# Patient Record
Sex: FEMALE | Race: WHITE
[De-identification: names, ages, dates, MRNs, and addresses within clinical notes are randomized per-mention and may not be internally consistent; named-entity substitution may affect disease eponyms.]

---

## 2018-02-15 NOTE — RAD
HISTORY: Cough



COMPARISONS: None



VIEWS: 4: Frontal dual-energy and lateral views of the chest.



FINDINGS:

CARDIOMEDIASTINAL SILHOUETTE: The cardiomediastinal silhouette is normal.

ALPHONSE: The alphonse are normal.

PLEURA: The costophrenic angles are sharp. No pleural abnormalities are noted.

LUNG PARENCHYMA: There is patchy alveolar opacification of the lingula.

ABDOMEN: The upper abdomen is clear. There is no subphrenic gas.

BONES AND SOFT TISSUES: No bone or soft tissue abnormalities are noted.

OTHER: None.



IMPRESSION:

PATCHY LINGULAR CONSOLIDATION. RECOMMEND FOLLOW-UP UNTIL RESOLUTION TO EXCLUDE UNDERLYING

PULMONARY PARENCHYMAL PATHOLOGY.

## 2018-02-15 NOTE — UC
Respiratory Complaint HPI





- HPI Summary


HPI Summary: 





cough x 8 days


+ chest congest, nasal congestion , fever, chills , body aches


cough is productive, yellow sputum


has been dizzy and lightheaded 





- History of Current Complaint


Chief Complaint: UCRespiratory


Stated Complaint: FEVER, COUGH, VOMITING


Time Seen by Provider: 02/15/18 14:02


Hx Obtained From: Patient


Hx Last Menstrual Period: 1/18/18


Onset/Duration: Gradual Onset, Lasting Days - 8, Still Present


Severity Initially: Moderate


Severity Currently: Moderate


Pain Intensity: 0


Character: Cough: Productive


Aggravating Factors: Exertion, Deep Breaths


Associated Signs And Symptoms: Positive: Fever, Chills, URI, Nasal Congestion.  

Negative: Calf Pain, Calf Swelling





- Allergies/Home Medications


Allergies/Adverse Reactions: 


 Allergies











Allergy/AdvReac Type Severity Reaction Status Date / Time


 


No Known Allergies Allergy   Verified 02/15/18 13:43











Home Medications: 


 Home Medications





Caffeine 200 mg PO DAILY 02/15/18 [History Confirmed 02/15/18]


Dm/P-Ephed/Acetaminoph/Doxylam [Nighttime D Cold-Flu Rlf Liq] 1 cap PO PRN 02/15

/18 [History]


Oral Contraceptive 1 tab PO DAILY 02/15/18 [History]











PMH/Surg Hx/FS Hx/Imm Hx





- Additional Past Medical History


Additional PMH: 





myotonic muscular dystrophy


LOW PUMONARY FUCTION





- Surgical History


Surgical History: Yes


Surgery Procedure, Year, and Place: cyst removals.  tubes in ears.  BILATERAL 

FALLOPIAN TUBES REMOVED





- Family History


Known Family History: 


   Negative: Diabetes





- Social History


Alcohol Use: None


Substance Use Type: None


Smoking Status (MU): Never Smoked Tobacco





Review of Systems


Constitutional: Fever, Chills, Fatigue


Skin: Negative


Eyes: Negative


ENT: Nasal Discharge


Respiratory: Cough


Cardiovascular: Negative


Gastrointestinal: Negative


Is Patient Immunocompromised?: No


All Other Systems Reviewed And Are Negative: Yes





Physical Exam


Triage Information Reviewed: Yes


Appearance: Well-Nourished, Pain Distress


Vital Signs: 


 Initial Vital Signs











Temp  98.4 F   02/15/18 13:46


 


Pulse  116   02/15/18 13:46


 


Resp  24   02/15/18 13:46


 


BP  112/89   02/15/18 13:46


 


Pulse Ox  97   02/15/18 13:46











Vital Signs Reviewed: Yes


Eyes: Positive: Conjunctiva Clear


ENT: Positive: Normal ENT inspection, Hearing grossly normal, Pharynx normal, 

Nasal drainage


Neck: Positive: Supple, Nontender, No Lymphadenopathy


Respiratory: Positive: Chest non-tender, Decreased breath sounds, Crackles - 

diffuse bilateral crackles


Cardiovascular: Positive: Tachycardia


Skin Exam: Normal





UC Diagnostic Evaluation





- Laboratory


O2 Sat by Pulse Oximetry: 97





Respiratory Course/Dx





- Differential Dx/Diagnosis


Provider Diagnoses: bronchitis





Discharge





- Discharge Plan


Referrals: 


Mahendra Garcia MD [Primary Care Provider] -

## 2019-04-01 NOTE — UC
Minor Trauma HPI





- HPI Summary


HPI Summary: 


 38 year old female with h/o R ankle fx in high school presents after fall at 

work today, when her roller cart tipped, taking patient down with her, hitting 

her right lower extremity with increased pain at knee and ankle.  + ambulatory 

with mild pain at b/l ankles, R knee.  + bruising at right and left shins.   ? 

hit head, however no LOC, no HA, no pain, no neck pain.    small abrasion R knee

, cleaned by nurse.   














- History of Current Complaint


Chief Complaint: UCLowerExtremity


Stated Complaint: WC-SP FALL,RT LEG, ARM INJURY


Time Seen by Provider: 04/01/19 13:19


Hx Obtained From: Patient


Hx Last Menstrual Period: 3/3/19


Pregnant?: No


Onset/Duration: Sudden Onset, Lasting Minutes


Onset Of Pain: Immediate


Severity Initially: Moderate


Severity Currently: Moderate


Pain Intensity: 7


Pain Scale Used: 0-10 Numeric


Mechanism Of Injury: Fall From Height Of: - standing


Aggravating Factor(s): Ambulation


Alleviating Factor(s): Rest


Associated Signs And Symptoms: Negative: Loss Of Consciousness





- Allergies/Home Medications


Allergies/Adverse Reactions: 


 Allergies











Allergy/AdvReac Type Severity Reaction Status Date / Time


 


No Known Allergies Allergy   Verified 04/01/19 13:12











Home Medications: 


 Home Medications





Cholecalciferol TAB* [Vitamin D TAB*] 1,000 unit PO DAILY 04/01/19 [History 

Confirmed 04/01/19]


Levonorgestrel (Iud) [Liletta IUD] 18.6 mcg IU 04/01/19 [History]











PMH/Surg Hx/FS Hx/Imm Hx


Previously Healthy: Yes





- Surgical History


Surgical History: Yes


Surgery Procedure, Year, and Place: cyst removals.  tubes in ears.  BILATERAL 

FALLOPIAN TUBES REMOVED.  D&C- TO REMOVE UTERINE POLPS





- Family History


Known Family History: 


   Negative: Diabetes





- Social History


Alcohol Use: Rare


Substance Use Type: None


Smoking Status (MU): Never Smoked Tobacco





Review of Systems


All Other Systems Reviewed And Are Negative: Yes


Constitutional: Positive: Negative


Neurovascular: Negative: Decreased Sensation, Decreased Pulses


Musculoskeletal: Positive: Arthralgia, Decreased ROM, Edema, Myalgia


Neurological: Negative: Headache


Psychological: Positive: Negative


Is Patient Immunocompromised?: No





Physical Exam





- Summary


Physical Exam Summary: 





TTP over distal patella with mild pre-patellar swelling noted.  


Triage Information Reviewed: Yes


Appearance: Well-Appearing, No Pain Distress, Well-Nourished


Vital Signs: 


 Initial Vital Signs











Temp  98.3 F   04/01/19 13:15


 


Pulse  84   04/01/19 13:15


 


Resp  17   04/01/19 13:15


 


BP  107/74   04/01/19 13:15


 


Pulse Ox  99   04/01/19 13:15











Vital Signs Reviewed: Yes


Eyes: Positive: Conjunctiva Clear


Musculoskeletal: Positive: Strength Intact, ROM Intact, Edema @ - mild b/l 

lower tib b/l.


Neurological: Positive: Muscle Tone Normal, Other: - bl knees- neg mcmurrays, 

no instability, neg acl/ pcl.   + TTP over MCL, LCL R knee, none L knee.  mild 

prepatellar swelling R knee.   neg homans b/l, achilles intact b/l, small 

bruising noted b/l lower tibia with TTP over bruising, no deformity noted.   

TTP over R anterior ankle, however with distaction no tenderness appreciated.  

no TTP over b/lmed/ lat mal.   no ankle swelling, bruising b/l.  full ROM both 

AROM/ PROM with full strength b/l ankles.   PT, DP 2+ b/l, SITLT>


Psychological Exam: Normal


Skin: Positive: Other - small superficial abrasion Right knee, cleaned without 

difficulty, no bleeding, bruising.





Minor Trauma Course/Dx





- Course


Course Of Treatment: 





radiograph R knee- negative fro fx.   Follow up with orthopedics if no 

improvement within 2-3 days 


- bone contusion 








- Differential Dx/Diagnosis


Differential Diagnosis/HQI/PQRI: Contusion(s), Fracture


Provider Diagnosis: 


 Contusion of both tibias, Prepatellar bursitis








Discharge





- Sign-Out/Discharge


Documenting (check all that apply): Patient Departure


All imaging exams completed and their final reports reviewed: Yes





- Discharge Plan


Condition: Good


Disposition: HOME


Patient Education Materials:  Contusion in Adults (ED), R.I.C.E. Treatment (ED)

, Knee Bursitis (ED)


Forms:  *Work Release


Referrals: 


Mahendra Garcia MD [Primary Care Provider] - 


Yair Ryder MD [Medical Doctor] - 


(follow up in 3-5 days if no improvement 


)


Additional Instructions: 


- Elevate, rest, ice bilateral shin contusions/ bruising 


- Motrin/ Tylenol as needed for pain.  


- Work note for tomorrow if needed 


- Follow up with orthopedics if no improvement 








- Billing Disposition and Condition


Condition: GOOD


Disposition: Home

## 2019-06-23 ENCOUNTER — HOSPITAL ENCOUNTER (EMERGENCY)
Dept: HOSPITAL 25 - UCCORT | Age: 39
Discharge: HOME | End: 2019-06-23
Payer: COMMERCIAL

## 2019-06-23 VITALS — SYSTOLIC BLOOD PRESSURE: 102 MMHG | DIASTOLIC BLOOD PRESSURE: 61 MMHG

## 2019-06-23 DIAGNOSIS — R11.10: Primary | ICD-10-CM

## 2019-06-23 DIAGNOSIS — E86.0: ICD-10-CM

## 2019-06-23 DIAGNOSIS — R19.7: ICD-10-CM

## 2019-06-23 PROCEDURE — 99212 OFFICE O/P EST SF 10 MIN: CPT

## 2019-06-23 PROCEDURE — G0463 HOSPITAL OUTPT CLINIC VISIT: HCPCS

## 2019-06-23 PROCEDURE — 96360 HYDRATION IV INFUSION INIT: CPT

## 2019-06-23 NOTE — UC
UC General HPI





- HPI Summary


HPI Summary: 





pt awoke around 4:40 this am feeling ill and weak.


she then developed some nausea, vomiting and diarrhea about every 1-2 hours 

since then.


at times she feels light headed like she wants to pass out.


she is afraid to drink because she will throw up.


she went to work and they were giving her a hard time about going home due to 

this illness.


she denies any abdominal pain, travel hx, fever and IBD.





- History of Current Complaint


Chief Complaint: UCGI


Stated Complaint: VOMITING,DIARRHEA,FATIGUE


Time Seen by Provider: 06/23/19 13:50


Hx Obtained From: Patient


Hx Last Menstrual Period: Liletta IUD


Pain Intensity: 6





- Allergy/Home Medications


Allergies/Adverse Reactions: 


 Allergies











Allergy/AdvReac Type Severity Reaction Status Date / Time


 


No Known Allergies Allergy   Verified 06/23/19 13:17














PMH/Surg Hx/FS Hx/Imm Hx





- Additional Past Medical History


Additional PMH: 





hyperinsomnia, myotonic muscular dystrophy, gastroparesis





- Surgical History


Surgical History: Yes


Surgery Procedure, Year, and Place: cyst removals.  tubes in ears.  BILATERAL 

FALLOPIAN TUBES REMOVED.  D&C- TO REMOVE UTERINE POLPS





- Family History


Known Family History: 


   Negative: Diabetes





- Social History


Occupation: Employed Full-time


Alcohol Use: None


Substance Use Type: None


Smoking Status (MU): Never Smoked Tobacco





- Immunization History


Most Recent Tetanus Shot: WITHIN 5 YEARS





Review of Systems


All Other Systems Reviewed And Are Negative: Yes


Constitutional: Positive: Fatigue


Gastrointestinal: Positive: Vomiting, Diarrhea, Nausea.  Negative: Abdominal 

Pain


Motor: Positive: Weakness


Neurological: Positive: Headache





Physical Exam


Triage Information Reviewed: Yes


Appearance: Well-Appearing


Vital Signs: 


 Initial Vital Signs











Temp  98.3 F   06/23/19 13:13


 


Pulse  100   06/23/19 13:13


 


Resp  16   06/23/19 13:13


 


BP  99/63   06/23/19 13:13


 


Pulse Ox  96   06/23/19 13:13











Vital Signs Reviewed: Yes


Eyes: Positive: Conjunctiva Clear


ENT: Positive: Pharynx normal, TMs normal, Other - Lips and teeth are dry.  

Negative: Nasal congestion, Nasal drainage


Neck: Positive: Supple, Nontender, No Lymphadenopathy


Respiratory: Positive: Lungs clear, Normal breath sounds, No respiratory 

distress


Cardiovascular: Positive: RRR, No Murmur


Abdomen Description: Positive: Nontender, No Organomegaly, Soft.  Negative: 

Distended, Guarding


Bowel Sounds: Positive: Present


Musculoskeletal: Positive: ROM Intact


Neurological: Positive: Alert


Psychological: Positive: Age Appropriate Behavior


Skin Exam: Normal


Skin: Negative: Rashes





Diagnostics





- Laboratory


Lab Results: 





BS=93. Positive orthostatic changes.





Re-Evaluation





- Re-Evaluation


  ** First Eval


Re-Evaluation Time: 15:21


Change: Improved - NAUSEA HAS RESOLVED. DRANK SOME GINGERALE WITHOUT N/V. PT 

ABLE TO BE UPRIGHT WITHOUT FEELING WEAK OR DIZZY. SHE FEELS WELL ENOUGH TO GO 

HOME.





Course/Dx





- Differential Dx - Multi-Symptom


Differential Diagnoses: Other - NON TOXIC. NO ACUTE ABDOMEN. S/S'S RESOLVED 

WITH FLUIDS AND ZOFRAN.





- Diagnoses


Provider Diagnosis: 


 Vomiting, Diarrhea, Dehydration








Discharge





- Sign-Out/Discharge


Documenting (check all that apply): Patient Departure


All imaging exams completed and their final reports reviewed: No Studies





- Discharge Plan


Condition: Stable


Disposition: HOME


Patient Education Materials:  Dehydration (ED), Acute Nausea and Vomiting (ED), 

Acute Diarrhea (ED)


Forms:  *Work Release


Referrals: 


Mahendra Garcia MD [Primary Care Provider] - 3 Days





- Billing Disposition and Condition


Condition: STABLE


Disposition: Home





- Attestation Statements


Provider Attestation: 





Per institutional requirements, I have reviewed the chart, however, I was not 

consulted specifically or made aware of this patient by the  midlevel provider.

  I did not personally evaluate, interact with , or disposition  this patient.








Please note my shift ended at 14:30

## 2019-09-03 ENCOUNTER — HOSPITAL ENCOUNTER (EMERGENCY)
Dept: HOSPITAL 25 - UCCORT | Age: 39
Discharge: HOME | End: 2019-09-03
Payer: COMMERCIAL

## 2019-09-03 VITALS — SYSTOLIC BLOOD PRESSURE: 114 MMHG | DIASTOLIC BLOOD PRESSURE: 78 MMHG

## 2019-09-03 DIAGNOSIS — J32.9: Primary | ICD-10-CM

## 2019-09-03 DIAGNOSIS — G71.11: ICD-10-CM

## 2019-09-03 PROCEDURE — G0463 HOSPITAL OUTPT CLINIC VISIT: HCPCS

## 2019-09-03 PROCEDURE — 99212 OFFICE O/P EST SF 10 MIN: CPT

## 2019-09-03 NOTE — UC
Throat Pain/Nasal Rivas HPI





- HPI Summary


HPI Summary: 





sinus pain and pressure x 10 days


nasal congestion , pnd, sore throat 


productive cough , yellow sputum


no fever, + chills, body aches


not better with otc meds





- History of Current Complaint


Chief Complaint: UCRespiratory


Stated Complaint: SINUS


Time Seen by Provider: 09/03/19 13:02


Hx Obtained From: Patient


Hx Last Menstrual Period: unknown


Pregnant?: No


Onset/Duration: Gradual Onset, Lasting Days - 10, Still Present


Severity: Moderate


Pain Intensity: 7


Cough: Productive


Associated Signs & Symptoms: Positive: Sinus Discomfort, Nasal Discharge.  

Negative: Dysphagia, Hoarseness, Fever, Vomiting, Rash





- Allergies/Home Medications


Allergies/Adverse Reactions: 


 Allergies











Allergy/AdvReac Type Severity Reaction Status Date / Time


 


No Known Allergies Allergy   Verified 09/03/19 12:46














PMH/Surg Hx/FS Hx/Imm Hx





- Additional Past Medical History


Additional PMH: 





Fatty Liver, Hypersomnia, IBS, Myotonic Muscular Dystrophy, Sleep Apnea





- Surgical History


Surgical History: Yes


Surgery Procedure, Year, and Place: cyst removals.  tubes in ears.  BILATERAL 

FALLOPIAN TUBES REMOVED.  D&C- TO REMOVE UTERINE POLPS





- Family History


Known Family History: 


   Negative: Diabetes





- Social History


Alcohol Use: None


Substance Use Type: None


Smoking Status (MU): Never Smoked Tobacco





- Immunization History


Most Recent Tetanus Shot: WITHIN 5 YEARS





Review of Systems


All Other Systems Reviewed And Are Negative: Yes


Constitutional: Positive: Negative


Skin: Positive: Negative


Eyes: Positive: Negative


ENT: Positive: Nasal Discharge, Sinus Congestion, Sinus Pain/Tenderness


Respiratory: Positive: Cough


Cardiovascular: Positive: Negative


Is Patient Immunocompromised?: No





Physical Exam


Triage Information Reviewed: Yes


Appearance: Well-Appearing, No Pain Distress, Well-Nourished


Vital Signs: 


 Initial Vital Signs











Temp  97.6 F   09/03/19 12:47


 


Pulse  77   09/03/19 12:47


 


Resp  18   09/03/19 12:47


 


BP  114/78   09/03/19 12:47


 


Pulse Ox  100   09/03/19 12:47











Eye Exam: Normal


Eyes: Positive: Conjunctiva Clear


ENT: Positive: Normal ENT inspection, Pharynx normal, Nasal congestion, Nasal 

drainage, TMs normal, Sinus tenderness.  Negative: Pharyngeal erythema, TM 

bulging, TM dull, TM red


Neck: Positive: Supple, Nontender, No Lymphadenopathy


Respiratory: Positive: Chest non-tender, Lungs clear, Normal breath sounds


Cardiovascular: Positive: RRR, No Murmur, Pulses Normal





Throat Pain/Nasal Course/Dx





- Differential Dx/Diagnosis


Provider Diagnosis: 


 Sinusitis








Discharge ED





- Sign-Out/Discharge


Documenting (check all that apply): Patient Departure


All imaging exams completed and their final reports reviewed: No Studies





- Discharge Plan


Condition: Stable


Disposition: HOME


Prescriptions: 


Amoxicillin/Clavulanate TAB* [Augmentin *] 875 mg PO BID #20 tab


Fluticasone NASAL SPRAY 50MCG* [Flonase NASAL SPRAY 50MCG*] 2 spray BOTH NARES 

DAILY #1 btl


Patient Education Materials:  Sinusitis (ED)


Referrals: 


Mahendra Garcia MD [Primary Care Provider] - If Needed





- Billing Disposition and Condition


Condition: STABLE


Disposition: Home